# Patient Record
Sex: FEMALE | Race: WHITE | NOT HISPANIC OR LATINO | URBAN - NONMETROPOLITAN AREA
[De-identification: names, ages, dates, MRNs, and addresses within clinical notes are randomized per-mention and may not be internally consistent; named-entity substitution may affect disease eponyms.]

---

## 2019-04-25 ENCOUNTER — IMPORTED ENCOUNTER (OUTPATIENT)
Dept: URBAN - NONMETROPOLITAN AREA CLINIC 1 | Facility: CLINIC | Age: 79
End: 2019-04-25

## 2019-04-25 PROBLEM — H52.4: Noted: 2019-04-25

## 2019-04-25 PROBLEM — E11.9: Noted: 2019-04-25

## 2019-04-25 PROBLEM — Z96.1: Noted: 2019-04-25

## 2019-04-25 PROCEDURE — 92004 COMPRE OPH EXAM NEW PT 1/>: CPT

## 2019-04-25 PROCEDURE — 92015 DETERMINE REFRACTIVE STATE: CPT

## 2019-04-25 NOTE — PATIENT DISCUSSION
NIDDM sans RetinopathyDiscussed diagnosis with patient. Discussed the risk of diabetic damage of the retina with potential vision loss and the importance of routine follow-up. Emphasized strict blood sugar control. Continue to monitor. P/C IOL OUDiscussed diagnosis in detail with patient. Both intraocular implants in place and stable. Continue to monitor. Presbyopia OUDiscussed refractive status in detail with patient. New glasses Rx given today. Continue to monitor.

## 2022-02-25 ENCOUNTER — ESTABLISHED PATIENT (OUTPATIENT)
Dept: RURAL CLINIC 2 | Facility: CLINIC | Age: 82
End: 2022-02-25

## 2022-02-25 DIAGNOSIS — E11.9: ICD-10-CM

## 2022-02-25 DIAGNOSIS — H26.492: ICD-10-CM

## 2022-02-25 DIAGNOSIS — H35.373: ICD-10-CM

## 2022-02-25 DIAGNOSIS — Z96.1: ICD-10-CM

## 2022-02-25 DIAGNOSIS — H52.4: ICD-10-CM

## 2022-02-25 DIAGNOSIS — H43.813: ICD-10-CM

## 2022-02-25 DIAGNOSIS — H16.223: ICD-10-CM

## 2022-02-25 PROCEDURE — 92134 CPTRZ OPH DX IMG PST SGM RTA: CPT

## 2022-02-25 PROCEDURE — 99214 OFFICE O/P EST MOD 30 MIN: CPT

## 2022-02-25 PROCEDURE — 92015 DETERMINE REFRACTIVE STATE: CPT

## 2022-02-25 ASSESSMENT — VISUAL ACUITY
OS_CC: 20/20
OD_CC: 20/25

## 2022-02-25 ASSESSMENT — TONOMETRY
OS_IOP_MMHG: 17
OD_IOP_MMHG: 17

## 2022-04-15 ASSESSMENT — TONOMETRY
OS_IOP_MMHG: 14
OD_IOP_MMHG: 14

## 2022-04-15 ASSESSMENT — VISUAL ACUITY
OS_SC: 20/30-
OD_SC: 20/60-

## 2022-07-15 ENCOUNTER — EMERGENCY VISIT (OUTPATIENT)
Dept: URBAN - NONMETROPOLITAN AREA CLINIC 1 | Facility: CLINIC | Age: 82
End: 2022-07-15

## 2022-07-15 DIAGNOSIS — S05.02XA: ICD-10-CM

## 2022-07-15 PROCEDURE — 99213 OFFICE O/P EST LOW 20 MIN: CPT

## 2022-07-15 ASSESSMENT — TONOMETRY
OD_IOP_MMHG: 17
OS_IOP_MMHG: 17

## 2022-07-15 ASSESSMENT — VISUAL ACUITY
OD_CC: 20/30
OS_CC: 20/40

## 2022-07-15 NOTE — PATIENT DISCUSSION
Discussed findings with patient today. Recommended using Besivance TID OU. Samples of Prolensa given for pain once a day. Continue to monitor.

## 2023-11-21 ENCOUNTER — COMPREHENSIVE EXAM (OUTPATIENT)
Dept: URBAN - NONMETROPOLITAN AREA CLINIC 1 | Facility: CLINIC | Age: 83
End: 2023-11-21

## 2023-11-21 DIAGNOSIS — H52.4: ICD-10-CM

## 2023-11-21 PROCEDURE — 92014 COMPRE OPH EXAM EST PT 1/>: CPT

## 2023-11-21 PROCEDURE — 92015 DETERMINE REFRACTIVE STATE: CPT

## 2023-11-21 ASSESSMENT — VISUAL ACUITY
OD_CC: 20/40
OD_PH: 20/40
OS_PH: 20/40
OU_CC: 20/40
OS_CC: 20/40-1

## 2023-11-21 ASSESSMENT — TONOMETRY
OD_IOP_MMHG: 17
OS_IOP_MMHG: 17

## 2023-12-01 ENCOUNTER — ESTABLISHED PATIENT (OUTPATIENT)
Dept: URBAN - NONMETROPOLITAN AREA CLINIC 1 | Facility: CLINIC | Age: 83
End: 2023-12-01

## 2023-12-01 DIAGNOSIS — H52.4: ICD-10-CM

## 2023-12-01 PROCEDURE — 92015 DETERMINE REFRACTIVE STATE: CPT

## 2023-12-01 ASSESSMENT — VISUAL ACUITY
OD_CC: 20/40
OS_CC: 20/40-1

## 2024-02-15 ENCOUNTER — CONTACT LENSES/GLASSES VISIT (OUTPATIENT)
Dept: URBAN - NONMETROPOLITAN AREA CLINIC 1 | Facility: CLINIC | Age: 84
End: 2024-02-15

## 2024-02-15 DIAGNOSIS — H52.4: ICD-10-CM

## 2024-02-15 PROCEDURE — 92015 DETERMINE REFRACTIVE STATE: CPT

## 2024-02-15 ASSESSMENT — VISUAL ACUITY
OD_CC: 20/40-1
OU_CC: 20/30
OS_CC: 20/30

## 2024-05-02 ENCOUNTER — CONTACT LENSES/GLASSES VISIT (OUTPATIENT)
Dept: URBAN - NONMETROPOLITAN AREA CLINIC 1 | Facility: CLINIC | Age: 84
End: 2024-05-02

## 2024-05-02 DIAGNOSIS — H52.4: ICD-10-CM

## 2024-05-02 PROCEDURE — 92012 INTRM OPH EXAM EST PATIENT: CPT | Mod: NC

## 2024-05-02 ASSESSMENT — VISUAL ACUITY
OS_CC: 20/25-2
OU_CC: 20/25-1
OD_CC: 20/32-2

## 2024-08-20 ENCOUNTER — FOLLOW UP (OUTPATIENT)
Dept: URBAN - NONMETROPOLITAN AREA CLINIC 1 | Facility: CLINIC | Age: 84
End: 2024-08-20

## 2024-08-20 DIAGNOSIS — H35.373: ICD-10-CM

## 2024-08-20 DIAGNOSIS — E11.9: ICD-10-CM

## 2024-08-20 PROCEDURE — 92134 CPTRZ OPH DX IMG PST SGM RTA: CPT

## 2024-08-20 PROCEDURE — 99213 OFFICE O/P EST LOW 20 MIN: CPT

## 2024-08-20 ASSESSMENT — VISUAL ACUITY
OS_CC: 20/30-1
OU_CC: 20/25-1
OD_CC: 20/30

## 2024-08-20 ASSESSMENT — TONOMETRY
OD_IOP_MMHG: 15
OS_IOP_MMHG: 15

## 2025-04-04 ENCOUNTER — COMPREHENSIVE EXAM (OUTPATIENT)
Age: 85
End: 2025-04-04

## 2025-04-04 DIAGNOSIS — H52.4: ICD-10-CM

## 2025-04-04 PROCEDURE — 92015 DETERMINE REFRACTIVE STATE: CPT

## 2025-04-04 PROCEDURE — 92014 COMPRE OPH EXAM EST PT 1/>: CPT
